# Patient Record
Sex: FEMALE | Race: WHITE | NOT HISPANIC OR LATINO | Employment: FULL TIME | URBAN - METROPOLITAN AREA
[De-identification: names, ages, dates, MRNs, and addresses within clinical notes are randomized per-mention and may not be internally consistent; named-entity substitution may affect disease eponyms.]

---

## 2017-08-22 ENCOUNTER — OFFICE VISIT (OUTPATIENT)
Dept: BARIATRICS/WEIGHT MGMT | Facility: CLINIC | Age: 42
End: 2017-08-22

## 2017-08-22 VITALS
TEMPERATURE: 97.9 F | HEART RATE: 74 BPM | OXYGEN SATURATION: 99 % | RESPIRATION RATE: 18 BRPM | DIASTOLIC BLOOD PRESSURE: 87 MMHG | BODY MASS INDEX: 38.18 KG/M2 | HEIGHT: 63 IN | SYSTOLIC BLOOD PRESSURE: 129 MMHG | WEIGHT: 215.5 LBS

## 2017-08-22 DIAGNOSIS — R10.13 DYSPEPSIA: ICD-10-CM

## 2017-08-22 DIAGNOSIS — M25.562 ARTHRALGIA OF LEFT KNEE: ICD-10-CM

## 2017-08-22 DIAGNOSIS — E55.9 VITAMIN D DEFICIENCY: ICD-10-CM

## 2017-08-22 DIAGNOSIS — Z98.84 S/P BARIATRIC SURGERY: ICD-10-CM

## 2017-08-22 DIAGNOSIS — R53.83 FATIGUE, UNSPECIFIED TYPE: Primary | ICD-10-CM

## 2017-08-22 DIAGNOSIS — E66.9 OBESITY, CLASS II, BMI 35-39.9: ICD-10-CM

## 2017-08-22 PROCEDURE — 99214 OFFICE O/P EST MOD 30 MIN: CPT | Performed by: PHYSICIAN ASSISTANT

## 2017-08-22 PROCEDURE — 94690 O2 UPTK REST INDIRECT: CPT | Performed by: PHYSICIAN ASSISTANT

## 2017-08-22 RX ORDER — ETODOLAC 400 MG/1
TABLET, FILM COATED ORAL
COMMUNITY
Start: 2017-06-04 | End: 2019-01-21

## 2017-08-22 RX ORDER — CITALOPRAM 40 MG/1
TABLET ORAL
COMMUNITY
Start: 2017-08-06

## 2017-08-22 NOTE — PROGRESS NOTES
"[facility]  [address]  Formerly [facility] [address]  [phone]        Patient Name:  Lucille Wolf.  :  1975      Date of Visit: 2017      Reason for Visit:   Annual Eval      HPI: Lucille Wolf is a 41 y.o. female 2 years s/p LSG/HHR by GDW on 7/24/15    LOV 2016 for annual eval.  Feeling good, but has had a weight loss stall for most of the last year.  Wanting to lose another 50 lbs if possible.  Feels she makes good food choices and has healthy habits, but does not know what she needs to do to \"jumpstart my weight loss again\".  Asking about the \"pouch reset\" which she has read about online.  No other issues/concerns.  Not currently tracking intake.  Admits that drinking 64 fluid oz/day is \"a struggle\".  Bariatric labs 2016 - low Vit D (18) w/ elevated Mg, Phos and Ferritin.  Taking a flintstone complete daily.  Was exercising consistently up until 2 months ago when she fell and hurt her (L) knee.  Still wearing a brace and gradually starting to exercise some more at this point.     Presurgery weight: 289 pounds.  Today's weight is 215 lb 8 oz (97.8 kg) pounds, today's  Body mass index is 38.17 kg/(m^2)., and her weight loss since surgery is 74 pounds.      Past Medical History:   Diagnosis Date   • Anxiety    • Basal cell carcinoma    • Depression    • Dyspepsia    • Dyspnea on exertion    • Fatigue    • Hypercholesteremia    • Hypertension    • Joint pain    • Morbid obesity    • Thrombocytosis      Past Surgical History:   Procedure Laterality Date   •  SECTION     • DIAGNOSTIC LAPAROSCOPY      for endometriosis   • DILATATION AND CURETTAGE     • GASTRIC SLEEVE LAPAROSCOPIC      s/p LSG/HHR by LYLEW on 7/24/15   • KNEE ARTHROSCOPY Left     scar tissue   • KNEE SURGERY Left     knee cap repair   • TOTAL ABDOMINAL HYSTERECTOMY      for endometriosis   • WRIST FRACTURE SURGERY Left      Outpatient " "Prescriptions Marked as Taking for the 8/22/17 encounter (Office Visit) with KAYLIN Valdes   Medication Sig Dispense Refill   • citalopram (CeleXA) 40 MG tablet          Allergies   Allergen Reactions   • Augmentin [Amoxicillin-Pot Clavulanate] GI Intolerance     Says Keflex is ok   • Lisinopril      States: makes her feel strange    • Codeine Rash   • Tape Rash       Social History     Social History   • Marital status:      Spouse name: N/A   • Number of children: N/A   • Years of education: N/A     Occupational History   • Not on file.     Social History Main Topics   • Smoking status: Never Smoker   • Smokeless tobacco: Never Used   • Alcohol use No   • Drug use: No   • Sexual activity: Defer      Comment:      Other Topics Concern   • Not on file     Social History Narrative   • No narrative on file       /87 (BP Location: Left arm, Patient Position: Sitting, Cuff Size: Large Adult)  Pulse 74  Temp 97.9 °F (36.6 °C) (Temporal Artery )   Resp 18  Ht 63\" (160 cm)  Wt 215 lb 8 oz (97.8 kg)  SpO2 99%  BMI 38.17 kg/m2    Physical Exam   Constitutional: She appears well-developed and well-nourished. She is cooperative.   HENT:   Mouth/Throat: Oropharynx is clear and moist and mucous membranes are normal.   Eyes: Conjunctivae are normal. No scleral icterus.   Cardiovascular: Normal rate.    Pulmonary/Chest: Effort normal.   Abdominal: Soft. There is no tenderness.   Musculoskeletal: Normal range of motion. She exhibits no edema.   Neurological: She is alert.   Skin: Skin is warm and dry. No rash noted.   Psychiatric: She has a normal mood and affect. Judgment normal.         Assessment:  2 years s/p LSG/HHR by BOYD on 7/24/15    ICD-10-CM ICD-9-CM   1. Fatigue, unspecified type R53.83 780.79   2. Dyspepsia R10.13 536.8   3. Arthralgia of left knee M25.562 719.46   4. Vitamin D deficiency E55.9 268.9   5. Obesity, Class II, BMI 35-39.9 E66.01 278.01   6. S/P bariatric surgery Z98.84 " V45.86       Plan:  BMR today to determine WLZ.  Advised to start tracking intake and adjust calories according to BMR.  Encouraged she research Paleo, Whole 30 or the 21 Day Fix.  Increase exercise /activity as tolerated - recommended yoga if not able to get back to the gym.  Routine bariatric labs ordered.  Continue vitamins w/ adjustments pending lab results.  Call w/ problems/concerns.     The patient was instructed to follow up in 3 months, sooner if needed.    note: approx 15 of the 25 minute visit was spent counseling on nutrition and necessary dietary/lifestyle modifications.

## 2017-08-26 LAB
25(OH)D3+25(OH)D2 SERPL-MCNC: 16 NG/ML (ref 30–100)
A-TOCOPHEROL VIT E SERPL-MCNC: 11.8 MG/L (ref 5.3–16.8)
ALBUMIN SERPL-MCNC: 4.1 G/DL (ref 3.5–5.5)
ALBUMIN/GLOB SERPL: 1.9 {RATIO} (ref 1.2–2.2)
ALP SERPL-CCNC: 62 IU/L (ref 39–117)
ALT SERPL-CCNC: 10 IU/L (ref 0–32)
AST SERPL-CCNC: 13 IU/L (ref 0–40)
BASOPHILS # BLD AUTO: 0 X10E3/UL (ref 0–0.2)
BASOPHILS NFR BLD AUTO: 0 %
BILIRUB SERPL-MCNC: 0.2 MG/DL (ref 0–1.2)
BUN SERPL-MCNC: 17 MG/DL (ref 6–20)
BUN/CREAT SERPL: 27 (ref 9–23)
CALCIUM SERPL-MCNC: 9.4 MG/DL (ref 8.7–10.2)
CHLORIDE SERPL-SCNC: 102 MMOL/L (ref 96–106)
CO2 SERPL-SCNC: 24 MMOL/L (ref 18–29)
CREAT SERPL-MCNC: 0.64 MG/DL (ref 0.57–1)
EOSINOPHIL # BLD AUTO: 0.1 X10E3/UL (ref 0–0.4)
EOSINOPHIL NFR BLD AUTO: 1 %
ERYTHROCYTE [DISTWIDTH] IN BLOOD BY AUTOMATED COUNT: 13.7 % (ref 12.3–15.4)
FERRITIN SERPL-MCNC: 153 NG/ML (ref 15–150)
FOLATE SERPL-MCNC: 10.8 NG/ML
GLOBULIN SER CALC-MCNC: 2.2 G/DL (ref 1.5–4.5)
GLUCOSE SERPL-MCNC: 91 MG/DL (ref 65–99)
HCT VFR BLD AUTO: 38.7 % (ref 34–46.6)
HGB BLD-MCNC: 13 G/DL (ref 11.1–15.9)
IMM GRANULOCYTES # BLD: 0 X10E3/UL (ref 0–0.1)
IMM GRANULOCYTES NFR BLD: 0 %
IRON SERPL-MCNC: 46 UG/DL (ref 27–159)
LYMPHOCYTES # BLD AUTO: 2.5 X10E3/UL (ref 0.7–3.1)
LYMPHOCYTES NFR BLD AUTO: 32 %
MAGNESIUM SERPL-MCNC: 2.3 MG/DL (ref 1.6–2.3)
MCH RBC QN AUTO: 29.3 PG (ref 26.6–33)
MCHC RBC AUTO-ENTMCNC: 33.6 G/DL (ref 31.5–35.7)
MCV RBC AUTO: 87 FL (ref 79–97)
METHYLMALONATE SERPL-SCNC: 170 NMOL/L (ref 0–378)
MONOCYTES # BLD AUTO: 0.8 X10E3/UL (ref 0.1–0.9)
MONOCYTES NFR BLD AUTO: 10 %
NEUTROPHILS # BLD AUTO: 4.5 X10E3/UL (ref 1.4–7)
NEUTROPHILS NFR BLD AUTO: 57 %
PHOSPHATE SERPL-MCNC: 4.3 MG/DL (ref 2.5–4.5)
PLATELET # BLD AUTO: 368 X10E3/UL (ref 150–379)
POTASSIUM SERPL-SCNC: 4.5 MMOL/L (ref 3.5–5.2)
PREALB SERPL-MCNC: 24 MG/DL (ref 14–35)
PROT SERPL-MCNC: 6.3 G/DL (ref 6–8.5)
PTH-INTACT SERPL-MCNC: 33 PG/ML (ref 15–65)
RBC # BLD AUTO: 4.44 X10E6/UL (ref 3.77–5.28)
SODIUM SERPL-SCNC: 142 MMOL/L (ref 134–144)
VIT A SERPL-MCNC: 47 UG/DL (ref 20–65)
VIT B1 BLD-SCNC: 160.8 NMOL/L (ref 66.5–200)
WBC # BLD AUTO: 7.9 X10E3/UL (ref 3.4–10.8)
ZINC SERPL-MCNC: 67 UG/DL (ref 56–134)

## 2017-09-12 RX ORDER — ERGOCALCIFEROL 1.25 MG/1
50000 CAPSULE ORAL WEEKLY
Qty: 12 CAPSULE | Refills: 0 | OUTPATIENT
Start: 2017-09-12 | End: 2019-01-21

## 2019-01-21 PROBLEM — J06.9 VIRAL URI WITH COUGH: Status: ACTIVE | Noted: 2019-01-21

## 2023-02-01 ENCOUNTER — TRANSCRIBE ORDERS (OUTPATIENT)
Dept: ADMINISTRATIVE | Facility: HOSPITAL | Age: 48
End: 2023-02-01
Payer: COMMERCIAL

## 2023-02-01 DIAGNOSIS — N63.10 MASS OF RIGHT BREAST, UNSPECIFIED QUADRANT: Primary | ICD-10-CM

## 2023-02-20 ENCOUNTER — HOSPITAL ENCOUNTER (OUTPATIENT)
Dept: ULTRASOUND IMAGING | Facility: HOSPITAL | Age: 48
Discharge: HOME | End: 2023-02-20
Payer: COMMERCIAL

## 2023-02-20 ENCOUNTER — HOSPITAL ENCOUNTER (OUTPATIENT)
Dept: MAMMOGRAPHY | Facility: HOSPITAL | Age: 48
Discharge: HOME | End: 2023-02-20
Payer: COMMERCIAL

## 2023-02-20 DIAGNOSIS — N63.10 MASS OF RIGHT BREAST, UNSPECIFIED QUADRANT: ICD-10-CM

## 2023-02-20 PROCEDURE — 76642 ULTRASOUND BREAST LIMITED: CPT

## 2023-02-20 PROCEDURE — 77062 BREAST TOMOSYNTHESIS BI: CPT | Performed by: RADIOLOGY

## 2023-02-20 PROCEDURE — G0279 TOMOSYNTHESIS, MAMMO: HCPCS

## 2023-02-20 PROCEDURE — 77066 DX MAMMO INCL CAD BI: CPT | Performed by: RADIOLOGY

## 2023-02-20 PROCEDURE — 76642 ULTRASOUND BREAST LIMITED: CPT | Performed by: RADIOLOGY

## 2023-02-20 PROCEDURE — 77066 DX MAMMO INCL CAD BI: CPT

## 2023-08-17 ENCOUNTER — OFFICE VISIT (OUTPATIENT)
Dept: FAMILY MEDICINE CLINIC | Facility: CLINIC | Age: 48
End: 2023-08-17
Payer: COMMERCIAL

## 2023-08-17 VITALS
OXYGEN SATURATION: 98 % | SYSTOLIC BLOOD PRESSURE: 126 MMHG | WEIGHT: 220 LBS | BODY MASS INDEX: 38.98 KG/M2 | DIASTOLIC BLOOD PRESSURE: 74 MMHG | HEART RATE: 108 BPM | HEIGHT: 63 IN

## 2023-08-17 DIAGNOSIS — C44.91 BASAL CELL CARCINOMA (BCC), UNSPECIFIED SITE: ICD-10-CM

## 2023-08-17 DIAGNOSIS — Z11.59 ENCOUNTER FOR HEPATITIS C SCREENING TEST FOR LOW RISK PATIENT: ICD-10-CM

## 2023-08-17 DIAGNOSIS — Z00.00 WELL ADULT EXAM: Primary | ICD-10-CM

## 2023-08-17 DIAGNOSIS — E66.01 MORBID (SEVERE) OBESITY DUE TO EXCESS CALORIES: ICD-10-CM

## 2023-08-17 NOTE — PROGRESS NOTES
"Chief Complaint  Establish Care    Subjective          Lucille Jeong presents to [facility] PR[facility]  History of Present Illness    Patient is here to establish care.     She states that it has been about a year since her last visit with a provider.     She has Basal cell carcinoma and Squamous cell carcinoma and is s/p Mohs surgery on face to remove these and she sees dermatology for this.     Mother with Htn, HLD Father with NKDA, Daughter with Seizure disorder.     No Primary family hx of breast or colon cancer    Colon cancer screening about 2 years ago.     Hx of gastric sleeve. She states that she has struggled with weight for many years. She states that she has tried several diets, and is trying to watch her diet without significant improvement in weight. She would like to talk about options to help with this.     Objective   Vital Signs:   /74   Pulse 108   Ht 160 cm (63\")   Wt 99.8 kg (220 lb)   SpO2 98%   BMI 38.97 kg/mý     Body mass index is 38.97 kg/mý.    Review of Systems    Past History:  Medical History: has a past medical history of Anxiety, Basal cell carcinoma, Depression, Diverticulosis, Dyspepsia, Dyspnea on exertion, Fatigue, Hypercholesteremia, Hypertension, Joint pain, Morbid obesity, and Thrombocytosis.   Surgical History: has a past surgical history that includes Total abdominal hysterectomy ();  section (); Knee surgery (Left, ); Knee Arthroscopy (Left, ); Wrist fracture surgery (Left, ); Dilation and curettage of uterus (); Diagnostic laparoscopy (); Gastric Sleeve (); Bariatric Surgery (2015); and Colonoscopy ().   Family History: family history includes Breast cancer in her paternal grandmother; Cancer in her mother; Hypertension in her maternal grandfather and mother; Obesity in her father and paternal grandmother.   Social History: reports that she has never smoked. She has never used " smokeless tobacco. She reports that she does not drink alcohol and does not use drugs.      Current Outpatient Medications:     citalopram (CeleXA) 40 MG tablet, , Disp: , Rfl:     Allergies: Augmentin [amoxicillin-pot clavulanate], Lisinopril, Codeine, and Tape    Physical Exam  Constitutional:       General: She is not in acute distress.     Appearance: She is not ill-appearing or toxic-appearing.   HENT:      Head: Normocephalic and atraumatic.   Cardiovascular:      Rate and Rhythm: Normal rate and regular rhythm.      Heart sounds: No murmur heard.  Pulmonary:      Effort: Pulmonary effort is normal. No respiratory distress.   Musculoskeletal:      Right lower leg: No edema.      Left lower leg: No edema.   Neurological:      General: No focal deficit present.      Mental Status: She is alert and oriented to person, place, and time.   Psychiatric:         Mood and Affect: Mood normal.         Thought Content: Thought content normal.        Result Review :                   Assessment and Plan    Diagnoses and all orders for this visit:    1. Well adult exam (Primary)  -     Comprehensive Metabolic Panel  -     CBC & Differential  -     Lipid Panel  -     TSH  -     T4, Free    2. Encounter for hepatitis C screening test for low risk patient  -     Hepatitis C antibody    3. Morbid (severe) obesity due to excess calories    4. Basal cell carcinoma (BCC), unspecified site    Blood work ordered and will contact with results when available. Will adjust medications based on abnormalities seen.     Discussed with patient her weight and recommended eating a balanced diet as well as adding in weight training to improve basal metabolic weight. Advised that currently Wegovy and other medications for weight loss are on back order at this time and difficult to get. Will defer starting something like this at this time.     Past medical and surgical history as well as allergies, family history and social history were  reviewed, and discussed with patient.  Chronic conditions were reviewed as well as medications.   Anticipatory guidance handouts including healthy diet, health maintenance, as well as regular exercise and general instructions were given via [employer], and patient was able to ask questions and discuss any concerns.        Follow Up   No follow-ups on file.  Patient was given instructions and counseling regarding her condition or for health maintenance advice. Please see specific information pulled into the AVS if appropriate.     Nataliia Jurado, DO

## 2023-08-18 LAB
ALBUMIN SERPL-MCNC: 4.2 G/DL (ref 3.9–4.9)
ALBUMIN/GLOB SERPL: 1.8 {RATIO} (ref 1.2–2.2)
ALP SERPL-CCNC: 83 IU/L (ref 44–121)
ALT SERPL-CCNC: 12 IU/L (ref 0–32)
AST SERPL-CCNC: 12 IU/L (ref 0–40)
BASOPHILS # BLD AUTO: 0.1 X10E3/UL (ref 0–0.2)
BASOPHILS NFR BLD AUTO: 1 %
BILIRUB SERPL-MCNC: 0.4 MG/DL (ref 0–1.2)
BUN SERPL-MCNC: 12 MG/DL (ref 6–24)
BUN/CREAT SERPL: 17 (ref 9–23)
CALCIUM SERPL-MCNC: 8.8 MG/DL (ref 8.7–10.2)
CHLORIDE SERPL-SCNC: 103 MMOL/L (ref 96–106)
CHOLEST SERPL-MCNC: 248 MG/DL (ref 100–199)
CO2 SERPL-SCNC: 25 MMOL/L (ref 20–29)
CREAT SERPL-MCNC: 0.7 MG/DL (ref 0.57–1)
EGFRCR SERPLBLD CKD-EPI 2021: 109 ML/MIN/1.73
EOSINOPHIL # BLD AUTO: 0.1 X10E3/UL (ref 0–0.4)
EOSINOPHIL NFR BLD AUTO: 1 %
ERYTHROCYTE [DISTWIDTH] IN BLOOD BY AUTOMATED COUNT: 13 % (ref 11.7–15.4)
GLOBULIN SER CALC-MCNC: 2.3 G/DL (ref 1.5–4.5)
GLUCOSE SERPL-MCNC: 87 MG/DL (ref 70–99)
HCT VFR BLD AUTO: 43.8 % (ref 34–46.6)
HCV IGG SERPL QL IA: NON REACTIVE
HDLC SERPL-MCNC: 59 MG/DL
HGB BLD-MCNC: 14.2 G/DL (ref 11.1–15.9)
IMM GRANULOCYTES # BLD AUTO: 0 X10E3/UL (ref 0–0.1)
IMM GRANULOCYTES NFR BLD AUTO: 0 %
LDLC SERPL CALC-MCNC: 164 MG/DL (ref 0–99)
LYMPHOCYTES # BLD AUTO: 1.7 X10E3/UL (ref 0.7–3.1)
LYMPHOCYTES NFR BLD AUTO: 27 %
MCH RBC QN AUTO: 28.5 PG (ref 26.6–33)
MCHC RBC AUTO-ENTMCNC: 32.4 G/DL (ref 31.5–35.7)
MCV RBC AUTO: 88 FL (ref 79–97)
MONOCYTES # BLD AUTO: 0.6 X10E3/UL (ref 0.1–0.9)
MONOCYTES NFR BLD AUTO: 9 %
NEUTROPHILS # BLD AUTO: 3.9 X10E3/UL (ref 1.4–7)
NEUTROPHILS NFR BLD AUTO: 62 %
PLATELET # BLD AUTO: 398 X10E3/UL (ref 150–450)
POTASSIUM SERPL-SCNC: 4 MMOL/L (ref 3.5–5.2)
PROT SERPL-MCNC: 6.5 G/DL (ref 6–8.5)
RBC # BLD AUTO: 4.99 X10E6/UL (ref 3.77–5.28)
SODIUM SERPL-SCNC: 143 MMOL/L (ref 134–144)
T4 FREE SERPL-MCNC: 1.17 NG/DL (ref 0.82–1.77)
TRIGL SERPL-MCNC: 140 MG/DL (ref 0–149)
TSH SERPL DL<=0.005 MIU/L-ACNC: 1.75 UIU/ML (ref 0.45–4.5)
VLDLC SERPL CALC-MCNC: 25 MG/DL (ref 5–40)
WBC # BLD AUTO: 6.3 X10E3/UL (ref 3.4–10.8)

## 2023-09-27 ENCOUNTER — OFFICE VISIT (OUTPATIENT)
Dept: FAMILY MEDICINE CLINIC | Facility: CLINIC | Age: 48
End: 2023-09-27
Payer: COMMERCIAL

## 2023-09-27 VITALS
HEART RATE: 85 BPM | WEIGHT: 248 LBS | OXYGEN SATURATION: 97 % | SYSTOLIC BLOOD PRESSURE: 130 MMHG | DIASTOLIC BLOOD PRESSURE: 70 MMHG | BODY MASS INDEX: 43.94 KG/M2 | HEIGHT: 63 IN

## 2023-09-27 DIAGNOSIS — E66.01 MORBID (SEVERE) OBESITY DUE TO EXCESS CALORIES: Primary | ICD-10-CM

## 2023-09-27 NOTE — PROGRESS NOTES
"Chief Complaint  No chief complaint on file.    Subjective          Lucille Jeong presents to [facility]  History of Present Illness    Patient presents the office today to discuss weight management.  She states she has tried several things in the past to help with weight, but has had difficulty losing weight and when she does lose it maintaining weight loss.  She has tried several different diets without any significant improvement including low calorie, high-protein, low-carb, and intermittent fasting.    Objective   Vital Signs:   /70   Pulse 85   Ht 160 cm (63\")   Wt 112 kg (248 lb)   SpO2 97%   BMI 43.93 kg/m²     Body mass index is 43.93 kg/m².    Review of Systems    Past History:  Medical History: has a past medical history of Anxiety, Basal cell carcinoma, Depression, Diverticulosis, Dyspepsia, Dyspnea on exertion, Fatigue, Hypercholesteremia, Hypertension, Joint pain, Morbid obesity, and Thrombocytosis.   Surgical History: has a past surgical history that includes Total abdominal hysterectomy ();  section (); Knee surgery (Left, ); Knee Arthroscopy (Left, ); Wrist fracture surgery (Left, ); Dilation and curettage of uterus (); Diagnostic laparoscopy (); Gastric Sleeve (); Bariatric Surgery (2015); and Colonoscopy ().   Family History: family history includes Breast cancer in her paternal grandmother; Cancer in her mother; Hypertension in her maternal grandfather and mother; Obesity in her father and paternal grandmother.   Social History: reports that she has never smoked. She has never used smokeless tobacco. She reports that she does not drink alcohol and does not use drugs.      Current Outpatient Medications:     citalopram (CeleXA) 40 MG tablet, , Disp: , Rfl:     Semaglutide-Weight Management 0.25 MG/0.5ML solution auto-injector, Inject 0.25 mg under the skin into the appropriate area as directed 1 " (One) Time Per Week., Disp: 1.5 mL, Rfl: 0    Allergies: Augmentin [amoxicillin-pot clavulanate], Lisinopril, Codeine, and Tape    Physical Exam  Constitutional:       General: She is not in acute distress.     Appearance: She is not ill-appearing or toxic-appearing.   HENT:      Head: Normocephalic and atraumatic.   Cardiovascular:      Rate and Rhythm: Normal rate and regular rhythm.      Heart sounds: No murmur heard.  Pulmonary:      Effort: Pulmonary effort is normal. No respiratory distress.   Neurological:      General: No focal deficit present.      Mental Status: She is alert and oriented to person, place, and time.   Psychiatric:         Mood and Affect: Mood normal.         Thought Content: Thought content normal.        Result Review :                   Assessment and Plan    Diagnoses and all orders for this visit:    1. Morbid (severe) obesity due to excess calories (Primary)    Other orders  -     Semaglutide-Weight Management 0.25 MG/0.5ML solution auto-injector; Inject 0.25 mg under the skin into the appropriate area as directed 1 (One) Time Per Week.  Dispense: 1.5 mL; Refill: 0    Discussed with patient options for weight management and will start Wegovy at this time 0.25 mg.  Discussed with patient that we will follow-up with her in about 1 month to discuss increasing medication, and then 3 months after starting the medicine for weight check.  She is agreeable to this.  Call with any new or worsening symptoms.    Follow Up   No follow-ups on file.  Patient was given instructions and counseling regarding her condition or for health maintenance advice. Please see specific information pulled into the AVS if appropriate.     Nataliia Jurado, DO

## 2023-10-13 ENCOUNTER — OFFICE VISIT (OUTPATIENT)
Dept: FAMILY MEDICINE CLINIC | Facility: CLINIC | Age: 48
End: 2023-10-13
Payer: COMMERCIAL

## 2023-10-13 VITALS
DIASTOLIC BLOOD PRESSURE: 94 MMHG | HEIGHT: 63 IN | WEIGHT: 245 LBS | BODY MASS INDEX: 43.41 KG/M2 | TEMPERATURE: 98.6 F | OXYGEN SATURATION: 99 % | SYSTOLIC BLOOD PRESSURE: 138 MMHG | HEART RATE: 103 BPM

## 2023-10-13 DIAGNOSIS — H10.33 ACUTE BACTERIAL CONJUNCTIVITIS OF BOTH EYES: Primary | ICD-10-CM

## 2023-10-13 PROCEDURE — 99213 OFFICE O/P EST LOW 20 MIN: CPT | Performed by: INTERNAL MEDICINE

## 2023-10-13 RX ORDER — POLYMYXIN B SULFATE AND TRIMETHOPRIM 1; 10000 MG/ML; [USP'U]/ML
1 SOLUTION OPHTHALMIC EVERY 4 HOURS
Qty: 10 ML | Refills: 0 | Status: SHIPPED | OUTPATIENT
Start: 2023-10-13

## 2023-10-13 NOTE — PROGRESS NOTES
Office Note     Name: Lucille Jeong    : 1975     MRN: [id number]     Chief Complaint  Eye Pain (Pt has concerns with pink eye today. )    Subjective     History of Present Illness:  Lucille Jeong is a 48 y.o. female who presents today for possible pinkeye    Left eye started getting red burning, oozing last night then today left started as well.  No fevers. No vision change.    Has had a little sensitivity to light but no vision change    Review of Systems:   Review of Systems    Past Medical History:   Past Medical History:   Diagnosis Date    Anxiety     Basal cell carcinoma     Depression     Diverticulosis     Diverticulitis    Dyspepsia     Dyspnea on exertion     Fatigue     Hypercholesteremia     Hypertension     Joint pain     Morbid obesity     Thrombocytosis        Past Surgical History:   Past Surgical History:   Procedure Laterality Date    BARIATRIC SURGERY  2015    Gastric sleeve     SECTION      COLONOSCOPY      Normal    DIAGNOSTIC LAPAROSCOPY      for endometriosis    DILATATION AND CURETTAGE      GASTRIC SLEEVE LAPAROSCOPIC      s/p LSG/HHR by BOYD on 7/24/15    KNEE ARTHROSCOPY Left     scar tissue    KNEE SURGERY Left     knee cap repair    TOTAL ABDOMINAL HYSTERECTOMY      for endometriosis    WRIST FRACTURE SURGERY Left        Family History:   Family History   Problem Relation Age of Onset    Cancer Mother     Hypertension Mother     Obesity Father     Hypertension Maternal Grandfather     Breast cancer Paternal Grandmother     Obesity Paternal Grandmother        Social History:   Social History     Socioeconomic History    Marital status:    Tobacco Use    Smoking status: Never    Smokeless tobacco: Never   Vaping Use    Vaping Use: Never used   Substance and Sexual Activity    Alcohol use: No    Drug use: No    Sexual activity: Not Currently     Partners: Male     Birth control/protection:  "Hysterectomy     Comment:        Immunizations:   Immunization History   Administered Date(s) Administered    COVID-19 (PFIZER) Purple Cap Monovalent 07/27/2021, 08/20/2021    Td (TDVAX) 05/01/2022        Medications:     Current Outpatient Medications:     citalopram (CeleXA) 40 MG tablet, , Disp: , Rfl:     Semaglutide-Weight Management 0.25 MG/0.5ML solution auto-injector, Inject 0.25 mg under the skin into the appropriate area as directed 1 (One) Time Per Week., Disp: 1.5 mL, Rfl: 0      Allergies:   Allergies   Allergen Reactions    Augmentin [Amoxicillin-Pot Clavulanate] GI Intolerance     Says Keflex is ok    Lisinopril      States: makes her feel strange     Codeine Rash    Tape Rash       Objective     Vital Signs  /94   Pulse 103   Temp 98.6 °F (37 °C) (Oral)   Ht 160 cm (63\")   Wt 111 kg (245 lb)   SpO2 99%   BMI 43.40 kg/m²   Estimated body mass index is 43.4 kg/m² as calculated from the following:    Height as of this encounter: 160 cm (63\").    Weight as of this encounter: 111 kg (245 lb).            Physical Exam  Vitals and nursing note reviewed.   Constitutional:       Appearance: Normal appearance.   Eyes:      Extraocular Movements: Extraocular movements intact.      Conjunctiva/sclera:      Right eye: Right conjunctiva is injected.      Left eye: Left conjunctiva is injected. Exudate present.      Pupils: Pupils are equal, round, and reactive to light.   Cardiovascular:      Rate and Rhythm: Normal rate and regular rhythm.      Heart sounds: No murmur heard.     No friction rub. No gallop.   Pulmonary:      Effort: Pulmonary effort is normal.      Breath sounds: Normal breath sounds. No wheezing, rhonchi or rales.   Neurological:      Mental Status: She is alert.            Procedures     Assessment and Plan     1. Acute bacterial conjunctivitis of both eyes    - trimethoprim-polymyxin b (Polytrim) 36463-0.1 UNIT/ML-% ophthalmic solution; Administer 1 drop to both eyes Every 4 " (Four) Hours.  Dispense: 10 mL; Refill: 0     Advised if symptoms get worse of sensitivity to ligth gets worse to be re-evaluated    Follow Up  Return if symptoms worsen or fail to improve.    Patient was advised to call the office or seek medical care if  any issues discussed during this visit worsen or persist or if new concerns arise        MD [facility]  [address] [address] [phone]

## 2023-10-24 ENCOUNTER — TELEPHONE (OUTPATIENT)
Dept: FAMILY MEDICINE CLINIC | Facility: CLINIC | Age: 48
End: 2023-10-24
Payer: COMMERCIAL

## 2023-12-04 ENCOUNTER — TELEPHONE (OUTPATIENT)
Dept: FAMILY MEDICINE CLINIC | Facility: CLINIC | Age: 48
End: 2023-12-04
Payer: COMMERCIAL

## 2023-12-04 NOTE — TELEPHONE ENCOUNTER
Caller: Lucille Jeong    Relationship to patient: Self    Best call back number: [phone]     Chief complaint: CHEST PAIN, CONGESTION, COUGH    Patient directed to call 911 or go to their nearest emergency room.     Patient verbalized understanding: [x] Yes  [] No  If no, why?    Additional notes:

## 2023-12-17 RX ORDER — CITALOPRAM 40 MG/1
TABLET ORAL
Status: CANCELLED | OUTPATIENT
Start: 2023-12-17

## 2024-01-02 ENCOUNTER — TELEPHONE (OUTPATIENT)
Dept: FAMILY MEDICINE CLINIC | Facility: CLINIC | Age: 49
End: 2024-01-02
Payer: COMMERCIAL

## 2024-01-02 NOTE — TELEPHONE ENCOUNTER
Caller: Lucille Jeong    Relationship: Self    Best call back number: [phone]     What medication are you requesting: WEGOVY    What are your current symptoms:     How long have you been experiencing symptoms:     Have you had these symptoms before:    [] Yes  [] No    Have you been treated for these symptoms before:   [] Yes  [] No    If a prescription is needed, what is your preferred pharmacy and phone number:  [facility] [place]    Additional notes: PATIENT WOULD LIKE TO BE PUT ON WEGOVY NOT THE OZEMPIC. PHARMACY WILL NEED A PRIOR AUTHORIZATION WITH THAT.

## 2024-01-03 NOTE — TELEPHONE ENCOUNTER
Called pt back about this. The medication is approved but it is out of stock at [facility]. Pt is going to call around and see if she can find it somewhere else.

## 2024-01-03 NOTE — TELEPHONE ENCOUNTER
HUB RELAY    I sent pt a [employer] message and I have left a couple voicemails about this already. She was never prescribed Ozempic. I already did a PA for Wegovy and it was approved.

## 2024-01-24 ENCOUNTER — OFFICE VISIT (OUTPATIENT)
Dept: FAMILY MEDICINE CLINIC | Facility: CLINIC | Age: 49
End: 2024-01-24
Payer: COMMERCIAL

## 2024-01-24 VITALS
DIASTOLIC BLOOD PRESSURE: 100 MMHG | WEIGHT: 243 LBS | HEIGHT: 63 IN | OXYGEN SATURATION: 97 % | SYSTOLIC BLOOD PRESSURE: 138 MMHG | HEART RATE: 95 BPM | BODY MASS INDEX: 43.05 KG/M2

## 2024-01-24 DIAGNOSIS — G43.911 INTRACTABLE MIGRAINE WITH STATUS MIGRAINOSUS, UNSPECIFIED MIGRAINE TYPE: Primary | ICD-10-CM

## 2024-01-24 PROCEDURE — 99213 OFFICE O/P EST LOW 20 MIN: CPT | Performed by: STUDENT IN AN ORGANIZED HEALTH CARE EDUCATION/TRAINING PROGRAM

## 2024-01-24 RX ORDER — RIMEGEPANT SULFATE 75 MG/75MG
75 TABLET, ORALLY DISINTEGRATING ORAL
Qty: 4 TABLET | Refills: 0 | COMMUNITY
Start: 2024-01-24
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
## 2024-02-22 ENCOUNTER — OFFICE VISIT (OUTPATIENT)
Dept: FAMILY MEDICINE CLINIC | Facility: CLINIC | Age: 49
End: 2024-02-22
Payer: COMMERCIAL

## 2024-02-22 VITALS
HEIGHT: 63 IN | SYSTOLIC BLOOD PRESSURE: 124 MMHG | DIASTOLIC BLOOD PRESSURE: 84 MMHG | WEIGHT: 243 LBS | BODY MASS INDEX: 43.05 KG/M2 | OXYGEN SATURATION: 97 % | HEART RATE: 94 BPM

## 2024-02-22 DIAGNOSIS — J02.9 SORE THROAT: ICD-10-CM

## 2024-02-22 DIAGNOSIS — B37.0 THRUSH: Primary | ICD-10-CM

## 2024-02-22 LAB
EXPIRATION DATE: NORMAL
INTERNAL CONTROL: NORMAL
Lab: NORMAL
S PYO AG THROAT QL: NEGATIVE

## 2024-02-22 NOTE — PROGRESS NOTES
"Chief Complaint  Sore Throat (Pain on tougne. X4days burns when drinks carbonated drinks)    Subjective          History of Present Illness  Lucille Jeong is here today with her sore tongue    Patient states that she started to have some tongue issues this week.  She states that for 5 days ago she made an oven pizza and thought she might of burned her tongue while eating it.  She states that for the next several days her tongue is gotten worse and worse.  She states that she can eat and it does not bother her but notices that when she drinks anything especially anything carbonated that it burns her tongue.  She states that she has not had any postnasal drainage allergy symptoms.  She denies any headaches.  She states she is no longer on Wegovy and does not have diabetes.  She states that overall the pain has been getting worse.  She states that she has not had any change in toothpaste toothbrush or mouthwash.      Objective   Vital Signs:   /84   Pulse 94   Ht 160 cm (63\")   Wt 110 kg (243 lb)   SpO2 97%   BMI 43.05 kg/m²     Body mass index is 43.05 kg/m².         Review of Systems      Current Outpatient Medications:   •  citalopram (CeleXA) 40 MG tablet, Take 1 tablet by mouth Daily., Disp: 90 tablet, Rfl: 1  •  topiramate (Topamax) 25 MG tablet, Take 2 tablets by mouth Daily., Disp: 30 tablet, Rfl: 1  •  nystatin (MYCOSTATIN) 100,000 unit/mL suspension, Swish and swallow 5 mL 4 (Four) Times a Day., Disp: 473 mL, Rfl: 0    Allergies: Augmentin [amoxicillin-pot clavulanate], Lisinopril, Codeine, and Tape    Physical Exam  Vitals and nursing note reviewed.   Constitutional:       General: She is not in acute distress.     Appearance: Normal appearance. She is not ill-appearing, toxic-appearing or diaphoretic.   HENT:      Head: Normocephalic and atraumatic.      Nose: Nose normal.      Mouth/Throat:      Mouth: Mucous membranes are moist.      Pharynx: Oropharynx is clear. No posterior " oropharyngeal erythema.   Eyes:      Pupils: Pupils are equal, round, and reactive to light.   Cardiovascular:      Rate and Rhythm: Normal rate and regular rhythm.      Heart sounds: Normal heart sounds.   Pulmonary:      Effort: Pulmonary effort is normal.   Neurological:      General: No focal deficit present.      Mental Status: She is alert.   Psychiatric:         Mood and Affect: Mood normal.         Behavior: Behavior normal.        Result Review :                   Assessment and Plan    Diagnoses and all orders for this visit:    1. Thrush (Primary)  -     nystatin (MYCOSTATIN) 100,000 unit/mL suspension; Swish and swallow 5 mL 4 (Four) Times a Day.  Dispense: 473 mL; Refill: 0  Discussed with patient that her mouth does not appear to have the prototypical thrush appearance but she does have some signs and will have her try some statin.  If this does not helping and things are getting worse she is obviously to come and talk to our office sooner.  2. Sore throat  -     POC Rapid Strep A  We discussed that her strep test was negative      Follow Up   No follow-ups on file.  Patient was given instructions and counseling regarding her condition or for health maintenance advice. Please see specific information pulled into the AVS if appropriate.     KAYLIN Valles  02/22/2024

## 2024-02-23 PROBLEM — M25.531 WRIST PAIN, RIGHT: Status: ACTIVE | Noted: 2022-06-29

## 2024-02-23 PROBLEM — D31.31 CHOROIDAL NEVUS OF RIGHT EYE: Status: ACTIVE | Noted: 2022-06-16

## 2024-02-23 PROBLEM — S69.81XD TFCC (TRIANGULAR FIBROCARTILAGE COMPLEX) INJURY, RIGHT, SUBSEQUENT ENCOUNTER: Status: ACTIVE | Noted: 2022-07-21

## 2024-02-23 PROBLEM — E55.9 VITAMIN D DEFICIENCY: Status: ACTIVE | Noted: 2018-05-21

## 2024-02-23 PROBLEM — H52.4 PRESBYOPIA OF BOTH EYES: Status: ACTIVE | Noted: 2022-06-16

## 2024-02-23 PROBLEM — S62.114D CLOSED NONDISPLACED FRACTURE OF TRIQUETRUM OF RIGHT WRIST WITH ROUTINE HEALING: Status: ACTIVE | Noted: 2022-06-29

## 2024-02-23 PROBLEM — M65.4 RADIAL STYLOID TENOSYNOVITIS (DE QUERVAIN): Status: ACTIVE | Noted: 2022-07-21
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
## 2024-02-23 PROBLEM — K57.90 DIVERTICULOSIS: Status: ACTIVE | Noted: 2019-04-26

## 2024-02-23 PROBLEM — S92.009A CALCANEUS FRACTURE: Status: ACTIVE | Noted: 2018-02-19

## 2024-02-23 PROBLEM — R07.9 CHEST PAIN: Status: ACTIVE | Noted: 2023-12-04

## 2024-03-20 ENCOUNTER — OFFICE VISIT (OUTPATIENT)
Dept: FAMILY MEDICINE CLINIC | Facility: CLINIC | Age: 49
End: 2024-03-20
Payer: COMMERCIAL

## 2024-03-20 VITALS
DIASTOLIC BLOOD PRESSURE: 86 MMHG | OXYGEN SATURATION: 97 % | BODY MASS INDEX: 42.88 KG/M2 | SYSTOLIC BLOOD PRESSURE: 136 MMHG | WEIGHT: 242 LBS | HEART RATE: 107 BPM | HEIGHT: 63 IN

## 2024-03-20 DIAGNOSIS — E78.2 MIXED HYPERLIPIDEMIA: ICD-10-CM

## 2024-03-20 DIAGNOSIS — G43.911 INTRACTABLE MIGRAINE WITH STATUS MIGRAINOSUS, UNSPECIFIED MIGRAINE TYPE: Primary | ICD-10-CM

## 2024-03-20 PROCEDURE — 99213 OFFICE O/P EST LOW 20 MIN: CPT | Performed by: STUDENT IN AN ORGANIZED HEALTH CARE EDUCATION/TRAINING PROGRAM

## 2024-03-20 RX ORDER — TOPIRAMATE 100 MG/1
100 TABLET, FILM COATED ORAL
Qty: 90 TABLET | Refills: 1 | Status: SHIPPED | OUTPATIENT
Start: 2024-03-20

## 2024-03-20 NOTE — PROGRESS NOTES
"Chief Complaint  Headache    Subjective          Crystal Alisa Jeong presents to [facility]  History of Present Illness    Patient presents the office today for follow-up on headaches.  She states that she has had significant improvement in her headaches.  She states that She is doing well with the medication, and not having any side effects.  She would like to incrase the dose of medications at this time.     She is also due for blood work at this time.     Objective   Vital Signs:   /86   Pulse 107   Ht 160 cm (63\")   Wt 110 kg (242 lb)   SpO2 97%   BMI 42.87 kg/m²     Body mass index is 42.87 kg/m².    Review of Systems    Past History:  Medical History: has a past medical history of Anxiety, Basal cell carcinoma, Depression, Diverticulosis, Dyspepsia, Dyspnea on exertion, Fatigue, Hypercholesteremia, Hypertension, Joint pain, Morbid obesity, and Thrombocytosis.   Surgical History: has a past surgical history that includes Total abdominal hysterectomy ();  section (); Knee surgery (Left, ); Knee Arthroscopy (Left, ); Wrist fracture surgery (Left, ); Dilation and curettage of uterus (); Diagnostic laparoscopy (); Gastric Sleeve (); Bariatric Surgery (2015); and Colonoscopy ().   Family History: family history includes Breast cancer in her paternal grandmother; Cancer in her mother; Hypertension in her father, maternal grandfather, and mother; Obesity in her father and paternal grandmother.   Social History: reports that she has never smoked. She has never been exposed to tobacco smoke. She has never used smokeless tobacco. She reports that she does not drink alcohol and does not use drugs.      Current Outpatient Medications:     citalopram (CeleXA) 40 MG tablet, Take 1 tablet by mouth Daily., Disp: 90 tablet, Rfl: 1    topiramate (Topamax) 100 MG tablet, Take 1 tablet by mouth every night at bedtime., Disp: 90 tablet, " Rfl: 1    Allergies: Augmentin [amoxicillin-pot clavulanate], Lisinopril, Codeine, and Tape    Physical Exam  Constitutional:       General: She is not in acute distress.     Appearance: She is not ill-appearing or toxic-appearing.   HENT:      Head: Normocephalic and atraumatic.   Pulmonary:      Effort: Pulmonary effort is normal. No respiratory distress.   Neurological:      General: No focal deficit present.      Mental Status: She is alert and oriented to person, place, and time.   Psychiatric:         Mood and Affect: Mood normal.         Thought Content: Thought content normal.          Result Review :                   Assessment and Plan    Diagnoses and all orders for this visit:    1. Intractable migraine with status migrainosus, unspecified migraine type (Primary)  -     Comprehensive Metabolic Panel; Future  -     CBC & Differential; Future  -     TSH; Future  -     T4, Free; Future    2. Mixed hyperlipidemia  -     Lipid Panel; Future    Other orders  -     topiramate (Topamax) 100 MG tablet; Take 1 tablet by mouth every night at bedtime.  Dispense: 90 tablet; Refill: 1    Will incrase dose of topamax at this time form 50mg to 100mg. Call with any new or worsening symptoms.     Blood work ordered and will contact with results when available. Will adjust medications based on abnormalities seen.         Follow Up   No follow-ups on file.  Patient was given instructions and counseling regarding her condition or for health maintenance advice. Please see specific information pulled into the AVS if appropriate.     Nataliia Jurado,

## 2024-05-31 ENCOUNTER — OFFICE VISIT (OUTPATIENT)
Dept: FAMILY MEDICINE CLINIC | Facility: CLINIC | Age: 49
End: 2024-05-31
Payer: COMMERCIAL

## 2024-05-31 VITALS
SYSTOLIC BLOOD PRESSURE: 122 MMHG | HEIGHT: 63 IN | OXYGEN SATURATION: 98 % | WEIGHT: 242 LBS | DIASTOLIC BLOOD PRESSURE: 72 MMHG | BODY MASS INDEX: 42.88 KG/M2 | HEART RATE: 84 BPM

## 2024-05-31 DIAGNOSIS — F41.1 GENERALIZED ANXIETY DISORDER: ICD-10-CM

## 2024-05-31 DIAGNOSIS — R68.89 EPISODES OF DECREASED ATTENTIVENESS: Primary | ICD-10-CM

## 2024-05-31 DIAGNOSIS — F33.1 MODERATE EPISODE OF RECURRENT MAJOR DEPRESSIVE DISORDER: ICD-10-CM

## 2024-05-31 PROCEDURE — 99213 OFFICE O/P EST LOW 20 MIN: CPT | Performed by: STUDENT IN AN ORGANIZED HEALTH CARE EDUCATION/TRAINING PROGRAM

## 2024-05-31 NOTE — PROGRESS NOTES
"Chief Complaint  trouble focusing    Subjective          Crystal Alisa Jeong presents to [facility]  History of Present Illness    Patient states that she is here for discussion of mood and poor focus. She states that this has been present since she was young, but was always told that she was just a little \"off task\" in grade school. She states that this was a bit better in high school and college and there is more structure.  Patient states that she always does significantly better when there is a high level of structure in her day-to-day.    She states that she has always had a thought that she could possibly have ADHD, but has never been evaluated for it in the past.    Objective   Vital Signs:   /72   Pulse 84   Ht 160 cm (63\")   Wt 110 kg (242 lb)   SpO2 98%   BMI 42.87 kg/m²     Body mass index is 42.87 kg/m².    Review of Systems    Past History:  Medical History: has a past medical history of Anxiety, Basal cell carcinoma, Depression, Diverticulosis, Dyspepsia, Dyspnea on exertion, Fatigue, Hypercholesteremia, Hypertension, Joint pain, Morbid obesity, and Thrombocytosis.   Surgical History: has a past surgical history that includes Total abdominal hysterectomy ();  section (); Knee surgery (Left, ); Knee Arthroscopy (Left, ); Wrist fracture surgery (Left, ); Dilation and curettage of uterus (); Diagnostic laparoscopy (); Gastric Sleeve (); Bariatric Surgery (2015); and Colonoscopy ().   Family History: family history includes Breast cancer in her paternal grandmother; Cancer in her mother; Hypertension in her father, maternal grandfather, and mother; Obesity in her father and paternal grandmother.   Social History: reports that she has never smoked. She has never been exposed to tobacco smoke. She has never used smokeless tobacco. She reports that she does not drink alcohol and does not use drugs.      Current " Outpatient Medications:     citalopram (CeleXA) 40 MG tablet, Take 1 tablet by mouth Daily., Disp: 90 tablet, Rfl: 1    topiramate (Topamax) 100 MG tablet, Take 1 tablet by mouth every night at bedtime., Disp: 90 tablet, Rfl: 1    Allergies: Augmentin [amoxicillin-pot clavulanate], Lisinopril, Codeine, and Tape    Physical Exam  Constitutional:       General: She is not in acute distress.     Appearance: She is not ill-appearing or toxic-appearing.   HENT:      Head: Normocephalic and atraumatic.   Cardiovascular:      Rate and Rhythm: Normal rate and regular rhythm.      Heart sounds: No murmur heard.  Pulmonary:      Effort: Pulmonary effort is normal. No respiratory distress.   Musculoskeletal:      Right lower leg: No edema.      Left lower leg: No edema.   Neurological:      General: No focal deficit present.      Mental Status: She is alert and oriented to person, place, and time.   Psychiatric:         Mood and Affect: Mood normal.         Thought Content: Thought content normal.          Result Review :                   Assessment and Plan    Diagnoses and all orders for this visit:    1. Episodes of decreased attentiveness (Primary)  -     Ambulatory Referral to Psychiatry    2. Generalized anxiety disorder  -     Ambulatory Referral to Psychiatry    3. Moderate episode of recurrent major depressive disorder  -     Ambulatory Referral to Psychiatry    Discussed with patient that we will send to psychiatry for evaluation of ADD/ADHD/other mood disorders that could be complicating this.  Recommend follow-up after she is seen by psychiatry or sooner with any new or worsening symptoms.  Continue current medications at current doses.    Follow Up   No follow-ups on file.  Patient was given instructions and counseling regarding her condition or for health maintenance advice. Please see specific information pulled into the AVS if appropriate.     Nataliia Jurado, DO

## 2024-06-24 ENCOUNTER — OFFICE VISIT (OUTPATIENT)
Dept: FAMILY MEDICINE CLINIC | Facility: CLINIC | Age: 49
End: 2024-06-24
Payer: COMMERCIAL

## 2024-06-24 VITALS
HEIGHT: 63 IN | WEIGHT: 242 LBS | OXYGEN SATURATION: 97 % | SYSTOLIC BLOOD PRESSURE: 128 MMHG | HEART RATE: 90 BPM | DIASTOLIC BLOOD PRESSURE: 74 MMHG | BODY MASS INDEX: 42.88 KG/M2

## 2024-06-24 DIAGNOSIS — R30.0 DYSURIA: Primary | ICD-10-CM

## 2024-06-24 LAB
BILIRUB BLD-MCNC: NEGATIVE MG/DL
CLARITY, POC: CLEAR
COLOR UR: YELLOW
EXPIRATION DATE: NORMAL
GLUCOSE UR STRIP-MCNC: NEGATIVE MG/DL
KETONES UR QL: NEGATIVE
LEUKOCYTE EST, POC: NEGATIVE
Lab: NORMAL
NITRITE UR-MCNC: NEGATIVE MG/ML
PH UR: 7 [PH] (ref 5–8)
PROT UR STRIP-MCNC: NEGATIVE MG/DL
RBC # UR STRIP: NEGATIVE /UL
SP GR UR: 1.02 (ref 1–1.03)
UROBILINOGEN UR QL: NORMAL

## 2024-06-24 PROCEDURE — 99213 OFFICE O/P EST LOW 20 MIN: CPT | Performed by: STUDENT IN AN ORGANIZED HEALTH CARE EDUCATION/TRAINING PROGRAM

## 2024-06-24 PROCEDURE — 81003 URINALYSIS AUTO W/O SCOPE: CPT | Performed by: STUDENT IN AN ORGANIZED HEALTH CARE EDUCATION/TRAINING PROGRAM

## 2024-06-24 RX ORDER — NITROFURANTOIN 25; 75 MG/1; MG/1
100 CAPSULE ORAL 2 TIMES DAILY
Qty: 10 CAPSULE | Refills: 0 | Status: SHIPPED | OUTPATIENT
Start: 2024-06-24

## 2024-06-24 NOTE — PROGRESS NOTES
"Chief Complaint  Urinary Tract Infection    Subjective          Lucille Jeong presents to [facility]  Urinary Tract Infection   This is a new problem. The current episode started 1 to 4 weeks ago. The problem occurs intermittently. The quality of the pain is described as burning. The pain is mild. There has been no fever. Associated symptoms include frequency, hesitancy and urgency. Pertinent negatives include no discharge, flank pain, nausea or vomiting. There is no history of recurrent UTIs, urinary stasis or a urological procedure.       Objective   Vital Signs:   /74   Pulse 90   Ht 160 cm (63\")   Wt 110 kg (242 lb)   SpO2 97%   BMI 42.87 kg/m²     Body mass index is 42.87 kg/m².    Review of Systems   Gastrointestinal:  Negative for nausea and vomiting.   Genitourinary:  Positive for frequency, hesitancy and urgency. Negative for flank pain.       Past History:  Medical History: has a past medical history of Anxiety, Basal cell carcinoma, Depression, Diverticulosis, Dyspepsia, Dyspnea on exertion, Fatigue, Hypercholesteremia, Hypertension, Joint pain, Morbid obesity, and Thrombocytosis.   Surgical History: has a past surgical history that includes Total abdominal hysterectomy ();  section (); Knee surgery (Left, ); Knee Arthroscopy (Left, ); Wrist fracture surgery (Left, ); Dilation and curettage of uterus (); Diagnostic laparoscopy (); Gastric Sleeve (); Bariatric Surgery (2015); and Colonoscopy ().   Family History: family history includes Breast cancer in her paternal grandmother; Cancer in her mother; Hypertension in her father, maternal grandfather, and mother; Obesity in her father and paternal grandmother.   Social History: reports that she has never smoked. She has never been exposed to tobacco smoke. She has never used smokeless tobacco. She reports that she does not drink alcohol and does not use " drugs.      Current Outpatient Medications:     citalopram (CeleXA) 40 MG tablet, Take 1 tablet by mouth Daily., Disp: 90 tablet, Rfl: 1    nitrofurantoin, macrocrystal-monohydrate, (Macrobid) 100 MG capsule, Take 1 capsule by mouth 2 (Two) Times a Day., Disp: 10 capsule, Rfl: 0    topiramate (Topamax) 100 MG tablet, Take 1 tablet by mouth every night at bedtime., Disp: 90 tablet, Rfl: 1    Allergies: Augmentin [amoxicillin-pot clavulanate], Lisinopril, Codeine, and Tape    Physical Exam  Constitutional:       General: She is not in acute distress.     Appearance: She is not toxic-appearing.   Cardiovascular:      Rate and Rhythm: Normal rate and regular rhythm.   Pulmonary:      Effort: Pulmonary effort is normal.      Breath sounds: Normal breath sounds.   Abdominal:      General: Abdomen is flat.      Palpations: Abdomen is soft.      Tenderness: There is abdominal tenderness (suprapubic). There is no right CVA tenderness or left CVA tenderness.   Neurological:      Mental Status: She is alert.   Psychiatric:         Mood and Affect: Mood normal.          Result Review :                   Assessment and Plan    Diagnoses and all orders for this visit:    1. Dysuria (Primary)  -     POC Urinalysis Dipstick, Automated  -     Urine Culture - Urine, Urine, Clean Catch    Other orders  -     nitrofurantoin, macrocrystal-monohydrate, (Macrobid) 100 MG capsule; Take 1 capsule by mouth 2 (Two) Times a Day.  Dispense: 10 capsule; Refill: 0    NoUTI on dip, but with classic symptoms. Culture sent. Will follow results. Will treat with Macrobid until culture results are back. Call with worsening symptoms or medication intolerance.      Follow Up   No follow-ups on file.  Patient was given instructions and counseling regarding her condition or for health maintenance advice. Please see specific information pulled into the AVS if appropriate.     Nataliia Jurado,

## 2024-06-26 LAB
BACTERIA UR CULT: NORMAL
BACTERIA UR CULT: NORMAL

## 2024-07-03 ENCOUNTER — TELEPHONE (OUTPATIENT)
Dept: FAMILY MEDICINE CLINIC | Facility: CLINIC | Age: 49
End: 2024-07-03
Payer: COMMERCIAL

## 2024-07-03 NOTE — TELEPHONE ENCOUNTER
"HUB TO REALLY--    \"  Dr Jurado office is trying to contact you regarding overdue labs that havent been completed. Labs from 4/7/2024... Would you like to still have these completed? \"  "

## 2024-07-25 ENCOUNTER — TELEPHONE (OUTPATIENT)
Dept: FAMILY MEDICINE CLINIC | Facility: CLINIC | Age: 49
End: 2024-07-25
Payer: COMMERCIAL

## 2024-08-13 ENCOUNTER — TELEPHONE (OUTPATIENT)
Dept: FAMILY MEDICINE CLINIC | Facility: CLINIC | Age: 49
End: 2024-08-13
Payer: COMMERCIAL

## 2024-08-13 NOTE — TELEPHONE ENCOUNTER
LVM regarding overdue labs that need to be completed. Advised pt to make a lab appointment to have these completed.

## 2024-08-24 ENCOUNTER — TELEPHONE (OUTPATIENT)
Dept: FAMILY MEDICINE CLINIC | Facility: CLINIC | Age: 49
End: 2024-08-24
Payer: COMMERCIAL

## 2024-12-17 ENCOUNTER — OFFICE VISIT (OUTPATIENT)
Dept: FAMILY MEDICINE CLINIC | Facility: CLINIC | Age: 49
End: 2024-12-17
Payer: COMMERCIAL

## 2024-12-17 VITALS
SYSTOLIC BLOOD PRESSURE: 138 MMHG | OXYGEN SATURATION: 98 % | HEART RATE: 90 BPM | WEIGHT: 238 LBS | DIASTOLIC BLOOD PRESSURE: 84 MMHG | BODY MASS INDEX: 42.17 KG/M2 | HEIGHT: 63 IN

## 2024-12-17 DIAGNOSIS — B37.2 YEAST INFECTION OF THE SKIN: Primary | ICD-10-CM

## 2024-12-17 PROCEDURE — 99213 OFFICE O/P EST LOW 20 MIN: CPT | Performed by: STUDENT IN AN ORGANIZED HEALTH CARE EDUCATION/TRAINING PROGRAM

## 2024-12-17 RX ORDER — KETOCONAZOLE 20 MG/G
1 CREAM TOPICAL DAILY
Qty: 60 G | Refills: 1 | Status: SHIPPED | OUTPATIENT
Start: 2024-12-17

## 2024-12-17 RX ORDER — FLUCONAZOLE 150 MG/1
150 TABLET ORAL
Qty: 2 TABLET | Refills: 0 | Status: SHIPPED | OUTPATIENT
Start: 2024-12-17

## 2024-12-17 RX ORDER — NYSTATIN 100000 U/G
CREAM TOPICAL
COMMUNITY
Start: 2024-10-17

## 2024-12-17 RX ORDER — TRIAMCINOLONE ACETONIDE 0.25 MG/G
CREAM TOPICAL
COMMUNITY
Start: 2024-10-17

## 2024-12-17 NOTE — PROGRESS NOTES
"Chief Complaint  Rash (Rash under the breasts. She asked her dermatologist about it, they prescribed triamcinolone and nystatin. It's not helping.)    Subjective          Crystal Alisa Jeong presents to [facility]  History of Present Illness    Presents the office today for rash under the breast.  She states that this has been there for the past several months, but does seem to wax and wane.  She has been using both nystatin and triamcinolone on it without any significant improvement in symptoms.  She states that the rash is itchy, and bothersome to her especially when she is wearing a bra.    Objective   Vital Signs:   /84   Pulse 90   Ht 160 cm (63\")   Wt 108 kg (238 lb)   SpO2 98%   BMI 42.16 kg/m²     Body mass index is 42.16 kg/m².    Review of Systems    Past History:  Medical History: has a past medical history of Anxiety, Basal cell carcinoma, Depression, Diverticulosis, Dyspepsia, Dyspnea on exertion, Fatigue, Hypercholesteremia, Hypertension, Joint pain, Morbid obesity, and Thrombocytosis.   Surgical History: has a past surgical history that includes Total abdominal hysterectomy ();  section (); Knee surgery (Left, ); Knee Arthroscopy (Left, ); Wrist fracture surgery (Left, ); Dilation and curettage of uterus (); Diagnostic laparoscopy (); Gastric Sleeve (); Bariatric Surgery (2015); and Colonoscopy ().   Family History: family history includes Breast cancer in her paternal grandmother; Cancer in her mother; Hypertension in her father, maternal grandfather, and mother; Obesity in her father and paternal grandmother.   Social History: reports that she has never smoked. She has never been exposed to tobacco smoke. She has never used smokeless tobacco. She reports that she does not drink alcohol and does not use drugs.      Current Outpatient Medications:     nystatin (MYCOSTATIN) 348702 UNIT/GM cream, APPLY TO " THE AFFECTED AREA(S) BY TOPICAL ROUTE 2 TIMES PER DAY UNTIL BETTER, Disp: , Rfl:     triamcinolone (KENALOG) 0.025 % cream, APPLY A THIN LAYER TO THE AFFECTED AREA(S) BY TOPICAL ROUTE 2 TIMES PER DAY UNTIL BETTER, Disp: , Rfl:     citalopram (CeleXA) 40 MG tablet, Take 1 tablet by mouth Daily., Disp: 90 tablet, Rfl: 1    fluconazole (Diflucan) 150 MG tablet, Take 1 tablet by mouth Every 3 (Three) Days., Disp: 2 tablet, Rfl: 0    ketoconazole (NIZORAL) 2 % cream, Apply 1 Application topically to the appropriate area as directed Daily., Disp: 60 g, Rfl: 1    nitrofurantoin, macrocrystal-monohydrate, (Macrobid) 100 MG capsule, Take 1 capsule by mouth 2 (Two) Times a Day., Disp: 10 capsule, Rfl: 0    topiramate (Topamax) 100 MG tablet, Take 1 tablet by mouth every night at bedtime., Disp: 90 tablet, Rfl: 1    Allergies: Augmentin [amoxicillin-pot clavulanate], Lisinopril, Codeine, and Tape    Physical Exam  Constitutional:       General: She is not in acute distress.     Appearance: She is not ill-appearing or toxic-appearing.   HENT:      Head: Normocephalic and atraumatic.   Pulmonary:      Effort: Pulmonary effort is normal. No respiratory distress.   Skin:     Comments: Erythematous, raised excoriated rash on the underside of both breasts and on the chest wall below.  Satellite lesions noted.   Neurological:      General: No focal deficit present.      Mental Status: She is alert and oriented to person, place, and time.   Psychiatric:         Mood and Affect: Mood normal.         Thought Content: Thought content normal.          Result Review :                   Assessment and Plan    Diagnoses and all orders for this visit:    1. Yeast infection of the skin (Primary)    Other orders  -     fluconazole (Diflucan) 150 MG tablet; Take 1 tablet by mouth Every 3 (Three) Days.  Dispense: 2 tablet; Refill: 0  -     ketoconazole (NIZORAL) 2 % cream; Apply 1 Application topically to the appropriate area as directed Daily.   Dispense: 60 g; Refill: 1    Will do Diflucan 1 tablet every 3 days x 2 and use ketoconazole cream to hopefully clear yeast infection of the skin.  Advised if she is not having significant improvement to contact the office or reach out to her dermatologist.    Follow Up   No follow-ups on file.  Patient was given instructions and counseling regarding her condition or for health maintenance advice. Please see specific information pulled into the AVS if appropriate.     Nataliia Jurado, DO

## 2024-12-31 ENCOUNTER — OFFICE VISIT (OUTPATIENT)
Dept: FAMILY MEDICINE CLINIC | Facility: CLINIC | Age: 49
End: 2024-12-31
Payer: COMMERCIAL

## 2024-12-31 VITALS
WEIGHT: 236 LBS | BODY MASS INDEX: 41.82 KG/M2 | HEIGHT: 63 IN | SYSTOLIC BLOOD PRESSURE: 126 MMHG | DIASTOLIC BLOOD PRESSURE: 74 MMHG | HEART RATE: 82 BPM

## 2024-12-31 DIAGNOSIS — B37.2 YEAST INFECTION OF THE SKIN: Primary | ICD-10-CM

## 2024-12-31 PROCEDURE — 99213 OFFICE O/P EST LOW 20 MIN: CPT | Performed by: STUDENT IN AN ORGANIZED HEALTH CARE EDUCATION/TRAINING PROGRAM

## 2024-12-31 RX ORDER — TRIAMCINOLONE ACETONIDE 0.25 MG/G
CREAM TOPICAL 2 TIMES DAILY
Start: 2024-12-31

## 2024-12-31 NOTE — PROGRESS NOTES
"Chief Complaint  Rash (Still having the same rash)    Subjective          Lucille Jeong presents to [facility]  History of Present Illness    Patient presents to the office for follow up on the rash under her breast. She states that she has been using the medications and states that this has helped some, but it is still inflamed and itchy.     Objective   Vital Signs:   /74   Pulse 82   Ht 160 cm (63\")   Wt 107 kg (236 lb)   BMI 41.81 kg/m²     Body mass index is 41.81 kg/m².    Review of Systems    Past History:  Medical History: has a past medical history of Anxiety, Basal cell carcinoma, Depression, Diverticulosis, Dyspepsia, Dyspnea on exertion, Fatigue, Hypercholesteremia, Hypertension, Joint pain, Morbid obesity, and Thrombocytosis.   Surgical History: has a past surgical history that includes Total abdominal hysterectomy ();  section (); Knee surgery (Left, ); Knee Arthroscopy (Left, ); Wrist fracture surgery (Left, ); Dilation and curettage of uterus (); Diagnostic laparoscopy (); Gastric Sleeve (); Bariatric Surgery (2015); and Colonoscopy ().   Family History: family history includes Breast cancer in her paternal grandmother; Cancer in her mother; Hypertension in her father, maternal grandfather, and mother; Obesity in her father and paternal grandmother.   Social History: reports that she has never smoked. She has never been exposed to tobacco smoke. She has never used smokeless tobacco. She reports that she does not drink alcohol and does not use drugs.      Current Outpatient Medications:     triamcinolone (KENALOG) 0.025 % cream, Apply  topically to the appropriate area as directed 2 (Two) Times a Day., Disp: , Rfl:     citalopram (CeleXA) 40 MG tablet, Take 1 tablet by mouth Daily., Disp: 90 tablet, Rfl: 1    ketoconazole (NIZORAL) 2 % cream, Apply 1 Application topically to the appropriate area as " directed Daily., Disp: 60 g, Rfl: 1    nystatin (MYCOSTATIN) [id number] UNIT/GM cream, APPLY TO THE AFFECTED AREA(S) BY TOPICAL ROUTE 2 TIMES PER DAY UNTIL BETTER, Disp: , Rfl:     topiramate (Topamax) 100 MG tablet, Take 1 tablet by mouth every night at bedtime., Disp: 90 tablet, Rfl: 1    Allergies: Augmentin [amoxicillin-pot clavulanate], Lisinopril, Codeine, and Tape    Physical Exam  Constitutional:       General: She is not in acute distress.     Appearance: She is not ill-appearing or toxic-appearing.   HENT:      Head: Normocephalic and atraumatic.   Cardiovascular:      Rate and Rhythm: Normal rate and regular rhythm.      Heart sounds: No murmur heard.  Pulmonary:      Effort: Pulmonary effort is normal. No respiratory distress.   Skin:     Comments: Area under the breast with significant improvement in the erythema and excoriations.    Neurological:      General: No focal deficit present.      Mental Status: She is alert and oriented to person, place, and time.   Psychiatric:         Mood and Affect: Mood normal.         Thought Content: Thought content normal.          Result Review :                   Assessment and Plan    Diagnoses and all orders for this visit:    1. Yeast infection of the skin (Primary)    Other orders  -     triamcinolone (KENALOG) 0.025 % cream; Apply  topically to the appropriate area as directed 2 (Two) Times a Day.    Recommend that patient continue to use the ketoconazole cream daily for the next week and add triamcinolone twice daily for remaining irritation and itching.  Advised that if she does not have resolution of symptoms over the next week to contact the office and we will discuss next steps.    Follow Up   No follow-ups on file.  Patient was given instructions and counseling regarding her condition or for health maintenance advice. Please see specific information pulled into the AVS if appropriate.     Nataliia Jurado, DO

## 2025-02-24 ENCOUNTER — OFFICE VISIT (OUTPATIENT)
Dept: FAMILY MEDICINE CLINIC | Facility: CLINIC | Age: 50
End: 2025-02-24
Payer: COMMERCIAL

## 2025-02-24 VITALS
BODY MASS INDEX: 41.82 KG/M2 | SYSTOLIC BLOOD PRESSURE: 128 MMHG | WEIGHT: 236 LBS | OXYGEN SATURATION: 97 % | DIASTOLIC BLOOD PRESSURE: 80 MMHG | HEART RATE: 100 BPM | HEIGHT: 63 IN

## 2025-02-24 DIAGNOSIS — J40 BRONCHITIS: Primary | ICD-10-CM

## 2025-02-24 PROCEDURE — 99213 OFFICE O/P EST LOW 20 MIN: CPT | Performed by: STUDENT IN AN ORGANIZED HEALTH CARE EDUCATION/TRAINING PROGRAM

## 2025-02-24 RX ORDER — BROMPHENIRAMINE MALEATE, PSEUDOEPHEDRINE HYDROCHLORIDE, AND DEXTROMETHORPHAN HYDROBROMIDE 2; 30; 10 MG/5ML; MG/5ML; MG/5ML
5 SYRUP ORAL 4 TIMES DAILY PRN
Qty: 240 ML | Refills: 0 | Status: SHIPPED | OUTPATIENT
Start: 2025-02-24

## 2025-02-24 RX ORDER — CEFDINIR 300 MG/1
300 CAPSULE ORAL 2 TIMES DAILY
Qty: 14 CAPSULE | Refills: 0 | Status: SHIPPED | OUTPATIENT
Start: 2025-02-24

## 2025-02-24 NOTE — PROGRESS NOTES
"Chief Complaint  URI (Cough x4 weeks)    Subjective          Crystal Alisa Jeong presents to [facility]  URI   This is a new problem. The current episode started 1 to 4 weeks ago. The problem has been gradually worsening. There has been no fever. Associated symptoms include chest pain, congestion, coughing, ear pain, headaches, rhinorrhea, a sore throat and wheezing. Pertinent negatives include no rash. She has tried acetaminophen, antihistamine and decongestant for the symptoms. The treatment provided mild relief.   She states that she originally thought that it was viral illness, but has not gotten over it completely.         Objective   Vital Signs:   /80   Pulse 100   Ht 160 cm (63\")   Wt 107 kg (236 lb)   SpO2 97%   BMI 41.81 kg/m²     Body mass index is 41.81 kg/m².    Review of Systems   Constitutional:  Negative for chills, fever and unexpected weight loss.   HENT:  Positive for congestion, ear pain, postnasal drip, rhinorrhea and sore throat.    Respiratory:  Positive for cough, shortness of breath and wheezing.    Cardiovascular:  Positive for chest pain.   Musculoskeletal:  Negative for myalgias.   Skin:  Negative for rash.       Past History:  Medical History: has a past medical history of Anxiety, Basal cell carcinoma, Depression, Diverticulosis, Dyspepsia, Dyspnea on exertion, Fatigue, Hypercholesteremia, Hypertension, Joint pain, Morbid obesity, and Thrombocytosis.   Surgical History: has a past surgical history that includes Total abdominal hysterectomy ();  section (); Knee surgery (Left, ); Knee Arthroscopy (Left, ); Wrist fracture surgery (Left, ); Dilation and curettage of uterus (); Diagnostic laparoscopy (); Gastric Sleeve (); Bariatric Surgery (2015); and Colonoscopy ().   Family History: family history includes Breast cancer in her paternal grandmother; Cancer in her mother; Hypertension in her " father, maternal grandfather, and mother; Obesity in her father and paternal grandmother.   Social History: reports that she has never smoked. She has never been exposed to tobacco smoke. She has never used smokeless tobacco. She reports that she does not drink alcohol and does not use drugs.      Current Outpatient Medications:     brompheniramine-pseudoephedrine-DM 30-2-10 MG/5ML syrup, Take 5 mL by mouth 4 (Four) Times a Day As Needed for Allergies., Disp: 240 mL, Rfl: 0    cefdinir (OMNICEF) 300 MG capsule, Take 1 capsule by mouth 2 (Two) Times a Day., Disp: 14 capsule, Rfl: 0    citalopram (CeleXA) 40 MG tablet, Take 1 tablet by mouth Daily., Disp: 90 tablet, Rfl: 1    ketoconazole (NIZORAL) 2 % cream, Apply 1 Application topically to the appropriate area as directed Daily., Disp: 60 g, Rfl: 1    nystatin (MYCOSTATIN) 257430 UNIT/GM cream, APPLY TO THE AFFECTED AREA(S) BY TOPICAL ROUTE 2 TIMES PER DAY UNTIL BETTER, Disp: , Rfl:     topiramate (Topamax) 100 MG tablet, Take 1 tablet by mouth every night at bedtime., Disp: 90 tablet, Rfl: 1    triamcinolone (KENALOG) 0.025 % cream, Apply  topically to the appropriate area as directed 2 (Two) Times a Day., Disp: , Rfl:     Allergies: Augmentin [amoxicillin-pot clavulanate], Lisinopril, Codeine, and Tape    Physical Exam  Constitutional:       General: She is not in acute distress.     Appearance: She is not ill-appearing or toxic-appearing.   HENT:      Head: Normocephalic and atraumatic.      Right Ear: Ear canal and external ear normal.      Left Ear: Ear canal and external ear normal.      Nose: Congestion and rhinorrhea present.      Mouth/Throat:      Pharynx: Posterior oropharyngeal erythema (cobblestoning) present.   Eyes:      General: No scleral icterus.  Cardiovascular:      Rate and Rhythm: Normal rate and regular rhythm.   Pulmonary:      Effort: Pulmonary effort is normal.      Breath sounds: Normal breath sounds.   Musculoskeletal:      Right lower  leg: No edema.      Left lower leg: No edema.   Neurological:      Mental Status: She is alert.          Result Review :                   Assessment and Plan    Diagnoses and all orders for this visit:    1. Bronchitis (Primary)    Other orders  -     cefdinir (OMNICEF) 300 MG capsule; Take 1 capsule by mouth 2 (Two) Times a Day.  Dispense: 14 capsule; Refill: 0  -     brompheniramine-pseudoephedrine-DM 30-2-10 MG/5ML syrup; Take 5 mL by mouth 4 (Four) Times a Day As Needed for Allergies.  Dispense: 240 mL; Refill: 0    Will treat with cefdinir and Bromfed for symptoms. Tylenol/Motrin for pain/fever. OTC cough and cold medication for symptoms. Nasal saline spray recommended. Cool mist humidifier at the bedside. RTC with new or worsening symptoms.      Follow Up   No follow-ups on file.  Patient was given instructions and counseling regarding her condition or for health maintenance advice. Please see specific information pulled into the AVS if appropriate.     Nataliia Jurado, DO

## 2025-05-06 ENCOUNTER — OFFICE VISIT (OUTPATIENT)
Dept: FAMILY MEDICINE CLINIC | Facility: CLINIC | Age: 50
End: 2025-05-06
Payer: COMMERCIAL

## 2025-05-06 VITALS
HEIGHT: 63 IN | BODY MASS INDEX: 41.29 KG/M2 | WEIGHT: 233 LBS | SYSTOLIC BLOOD PRESSURE: 140 MMHG | OXYGEN SATURATION: 97 % | HEART RATE: 97 BPM | DIASTOLIC BLOOD PRESSURE: 80 MMHG

## 2025-05-06 DIAGNOSIS — R42 DIZZINESS: ICD-10-CM

## 2025-05-06 DIAGNOSIS — Z12.31 ENCOUNTER FOR SCREENING MAMMOGRAM FOR MALIGNANT NEOPLASM OF BREAST: ICD-10-CM

## 2025-05-06 DIAGNOSIS — E55.9 VITAMIN D DEFICIENCY: ICD-10-CM

## 2025-05-06 DIAGNOSIS — H53.9 VISUAL CHANGES: ICD-10-CM

## 2025-05-06 DIAGNOSIS — Z00.00 WELL ADULT EXAM: Primary | ICD-10-CM

## 2025-05-06 RX ORDER — CITALOPRAM HYDROBROMIDE 40 MG/1
40 TABLET ORAL DAILY
Qty: 30 TABLET | Refills: 0 | Status: SHIPPED | OUTPATIENT
Start: 2025-05-06
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
## 2025-05-07 ENCOUNTER — RESULTS FOLLOW-UP (OUTPATIENT)
Dept: FAMILY MEDICINE CLINIC | Facility: CLINIC | Age: 50
End: 2025-05-07
Payer: COMMERCIAL

## 2025-05-07 LAB
25(OH)D3+25(OH)D2 SERPL-MCNC: 5.4 NG/ML (ref 30–100)
ALBUMIN SERPL-MCNC: 4.2 G/DL (ref 3.9–4.9)
ALP SERPL-CCNC: 82 IU/L (ref 44–121)
ALT SERPL-CCNC: 19 IU/L (ref 0–32)
AST SERPL-CCNC: 23 IU/L (ref 0–40)
BASOPHILS # BLD AUTO: 0.1 X10E3/UL (ref 0–0.2)
BASOPHILS NFR BLD AUTO: 1 %
BILIRUB SERPL-MCNC: 0.4 MG/DL (ref 0–1.2)
BUN SERPL-MCNC: 11 MG/DL (ref 6–24)
BUN/CREAT SERPL: 17 (ref 9–23)
CALCIUM SERPL-MCNC: 8.8 MG/DL (ref 8.7–10.2)
CHLORIDE SERPL-SCNC: 105 MMOL/L (ref 96–106)
CHOLEST SERPL-MCNC: 276 MG/DL (ref 100–199)
CO2 SERPL-SCNC: 24 MMOL/L (ref 20–29)
CREAT SERPL-MCNC: 0.64 MG/DL (ref 0.57–1)
EGFRCR SERPLBLD CKD-EPI 2021: 110 ML/MIN/1.73
EOSINOPHIL # BLD AUTO: 0.1 X10E3/UL (ref 0–0.4)
EOSINOPHIL NFR BLD AUTO: 1 %
ERYTHROCYTE [DISTWIDTH] IN BLOOD BY AUTOMATED COUNT: 13 % (ref 11.7–15.4)
GLOBULIN SER CALC-MCNC: 1.9 G/DL (ref 1.5–4.5)
GLUCOSE SERPL-MCNC: 86 MG/DL (ref 70–99)
HBA1C MFR BLD: 5.6 % (ref 4.8–5.6)
HCT VFR BLD AUTO: 45.6 % (ref 34–46.6)
HDLC SERPL-MCNC: 65 MG/DL
HGB BLD-MCNC: 14.3 G/DL (ref 11.1–15.9)
IMM GRANULOCYTES # BLD AUTO: 0 X10E3/UL (ref 0–0.1)
IMM GRANULOCYTES NFR BLD AUTO: 0 %
LDLC SERPL CALC-MCNC: 186 MG/DL (ref 0–99)
LYMPHOCYTES # BLD AUTO: 1.9 X10E3/UL (ref 0.7–3.1)
LYMPHOCYTES NFR BLD AUTO: 27 %
MCH RBC QN AUTO: 28.5 PG (ref 26.6–33)
MCHC RBC AUTO-ENTMCNC: 31.4 G/DL (ref 31.5–35.7)
MCV RBC AUTO: 91 FL (ref 79–97)
MONOCYTES # BLD AUTO: 0.7 X10E3/UL (ref 0.1–0.9)
MONOCYTES NFR BLD AUTO: 10 %
NEUTROPHILS # BLD AUTO: 4.2 X10E3/UL (ref 1.4–7)
NEUTROPHILS NFR BLD AUTO: 61 %
PLATELET # BLD AUTO: 424 X10E3/UL (ref 150–450)
POTASSIUM SERPL-SCNC: 4.1 MMOL/L (ref 3.5–5.2)
PROT SERPL-MCNC: 6.1 G/DL (ref 6–8.5)
RBC # BLD AUTO: 5.02 X10E6/UL (ref 3.77–5.28)
SODIUM SERPL-SCNC: 145 MMOL/L (ref 134–144)
T4 FREE SERPL-MCNC: 1.2 NG/DL (ref 0.82–1.77)
TRIGL SERPL-MCNC: 139 MG/DL (ref 0–149)
TSH SERPL DL<=0.005 MIU/L-ACNC: 2.05 UIU/ML (ref 0.45–4.5)
VLDLC SERPL CALC-MCNC: 25 MG/DL (ref 5–40)
WBC # BLD AUTO: 6.9 X10E3/UL (ref 3.4–10.8)

## 2025-05-07 RX ORDER — ROSUVASTATIN CALCIUM 5 MG/1
5 TABLET, COATED ORAL DAILY
Qty: 90 TABLET | Refills: 1 | Status: SHIPPED | OUTPATIENT
Start: 2025-05-07

## 2025-05-23 ENCOUNTER — OFFICE VISIT (OUTPATIENT)
Dept: FAMILY MEDICINE CLINIC | Facility: CLINIC | Age: 50
End: 2025-05-23
Payer: COMMERCIAL

## 2025-05-23 VITALS
DIASTOLIC BLOOD PRESSURE: 86 MMHG | HEIGHT: 63 IN | OXYGEN SATURATION: 97 % | WEIGHT: 237 LBS | HEART RATE: 119 BPM | BODY MASS INDEX: 41.99 KG/M2 | SYSTOLIC BLOOD PRESSURE: 128 MMHG

## 2025-05-23 DIAGNOSIS — H61.23 BILATERAL IMPACTED CERUMEN: Primary | ICD-10-CM

## 2025-05-23 NOTE — PROGRESS NOTES
".Chief Complaint  Ear Fullness (Rt ear fullness, unable to hear/clogged up. )    Subjective          History of Present Illness  Lucille Jeong is here today with her  History of Present Illness  The patient presents for evaluation of right ear pain.    She reports experiencing discomfort in her right ear, which she attributes to the use of Debrox. She initiated the use of Debrox on 05/19/2025, adhering to the instructions on the packaging to use it for a duration of 4 days, followed by an ear flush. However, she did not observe any improvement post-flush. Currently, she describes her ear as being completely clogged, resulting in significant hearing impairment. A few weeks prior, she had a consultation with Dr. Jurado who was unable to visualize her eardrum due to cerumen impaction. Dr. Jurado recommended the use of Debrox, but due to her travel commitments, she deferred the treatment until her return. She also recalls a similar incident approximately 10 years ago when she used Debrox in both ears simultaneously, leading to temporary bilateral hearing loss.    Objective   Vital Signs:   /86   Pulse 119   Ht 160 cm (63\")   Wt 108 kg (237 lb)   SpO2 97%   BMI 41.98 kg/m²     Body mass index is 41.98 kg/m².  Class 3 Severe Obesity (BMI >=40). Obesity-related health conditions include the following: dyslipidemias. Obesity is improving with treatment. BMI is is above average; BMI management plan is completed. We discussed Information on healthy weight added to patient's after visit summary.      Review of Systems      Current Outpatient Medications:     citalopram (CeleXA) 40 MG tablet, Take 1 tablet by mouth Daily., Disp: 30 tablet, Rfl: 0    ketoconazole (NIZORAL) 2 % cream, Apply 1 Application topically to the appropriate area as directed Daily., Disp: 60 g, Rfl: 1    nystatin (MYCOSTATIN) 604367 UNIT/GM cream, APPLY TO THE AFFECTED AREA(S) BY TOPICAL ROUTE 2 TIMES PER DAY UNTIL BETTER, Disp: , " Rfl:     rosuvastatin (Crestor) 5 MG tablet, Take 1 tablet by mouth Daily., Disp: 90 tablet, Rfl: 1    topiramate (Topamax) 100 MG tablet, Take 1 tablet by mouth every night at bedtime., Disp: 90 tablet, Rfl: 1    triamcinolone (KENALOG) 0.025 % cream, Apply  topically to the appropriate area as directed 2 (Two) Times a Day., Disp: , Rfl:     Allergies: Augmentin [amoxicillin-pot clavulanate], Lisinopril, Codeine, and Tape    Physical Exam  Vitals and nursing note reviewed.   Constitutional:       General: She is not in acute distress.     Appearance: Normal appearance. She is not ill-appearing, toxic-appearing or diaphoretic.   HENT:      Head: Normocephalic and atraumatic.      Right Ear: There is impacted cerumen.      Left Ear: There is impacted cerumen.      Ears:      Comments: After irrigation- no obstruction TM clear Michael  Cardiovascular:      Rate and Rhythm: Normal rate and regular rhythm.      Heart sounds: Normal heart sounds.   Pulmonary:      Effort: Pulmonary effort is normal.      Breath sounds: Normal breath sounds.   Neurological:      General: No focal deficit present.      Mental Status: She is alert.   Psychiatric:         Mood and Affect: Mood normal.         Behavior: Behavior normal.      Physical Exam  Ears: Cerumen impaction noted in the right ear, cerumen soft.    Result Review :          Results      Ear Cerumen Removal    Date/Time: 5/23/2025 12:17 PM    Performed by: Stacy Niño PA  Authorized by: Stacy Niño PA    Anesthesia:  Local Anesthetic: none  Location details: left ear and right ear  Patient tolerance: patient tolerated the procedure well with no immediate complications  Procedure type: irrigation   Sedation:  Patient sedated: no              Assessment and Plan    Diagnoses and all orders for this visit:    1. Bilateral impacted cerumen (Primary)      Assessment & Plan    - Reports right ear is completely clogged and hearing is impaired after using Debrox as  recommended.  - Examination reveals a significant amount of soft cerumen obstructing the view of the eardrum.  - Discussed the condition and previous similar experiences; recommended ear irrigation.  Ear irrigation dislodged cerumen from noth ears and hearing now back to normal      Follow Up   No follow-ups on file.  Patient was given instructions and counseling regarding her condition or for health maintenance advice. Please see specific information pulled into the AVS if appropriate.     Patient or patient representative verbalized consent for the use of Ambient Listening during the visit with  KAYLIN Valles for chart documentation. 5/23/2025  12:17 EDT    KAYLIN Valles  05/23/2025

## 2025-06-27 ENCOUNTER — OFFICE VISIT (OUTPATIENT)
Dept: FAMILY MEDICINE CLINIC | Facility: CLINIC | Age: 50
End: 2025-06-27
Payer: COMMERCIAL

## 2025-06-27 VITALS
HEART RATE: 92 BPM | WEIGHT: 241 LBS | OXYGEN SATURATION: 96 % | HEIGHT: 63 IN | BODY MASS INDEX: 42.7 KG/M2 | DIASTOLIC BLOOD PRESSURE: 100 MMHG | SYSTOLIC BLOOD PRESSURE: 158 MMHG

## 2025-06-27 DIAGNOSIS — F41.1 GENERALIZED ANXIETY DISORDER: ICD-10-CM

## 2025-06-27 DIAGNOSIS — E66.01 MORBID (SEVERE) OBESITY DUE TO EXCESS CALORIES: Primary | ICD-10-CM

## 2025-06-27 NOTE — PROGRESS NOTES
"Chief Complaint  Follow-up (Wants to discuss weightloss options and changing celexa )    Subjective          Lucille Jeong presents to [facility]  History of Present Illness    Patient is here to talk about options for her mood. She states that she has been off of the celexa for a few weeks, and she has been having trouble at home.  She states that she has been having difficulty with mood as well as weight management.  She states that she would like to avoid knee injections if possible, would like to discuss something that will help with weight management and a pill form.    Patient states that she has been on Wellbutrin in the past and this is done okay for her mood.    Objective   Vital Signs:   /100   Pulse 92   Ht 160 cm (63\")   Wt 109 kg (241 lb)   SpO2 96%   BMI 42.69 kg/m²     Body mass index is 42.69 kg/m².    Review of Systems    Past History:  Medical History: has a past medical history of Anxiety, Basal cell carcinoma, Depression, Diverticulosis, Dyspepsia, Dyspnea on exertion, Fatigue, Hypercholesteremia, Hypertension, Joint pain, Morbid obesity, and Thrombocytosis.   Surgical History: has a past surgical history that includes Total abdominal hysterectomy ();  section (); Knee surgery (Left, ); Knee Arthroscopy (Left, ); Wrist fracture surgery (Left, ); Dilation and curettage of uterus (); Diagnostic laparoscopy (); Gastric Sleeve (); Bariatric Surgery (2015); and Colonoscopy ().   Family History: family history includes Breast cancer in her paternal grandmother; Cancer in her mother; Hypertension in her father, maternal grandfather, and mother; Obesity in her father and paternal grandmother.   Social History: reports that she has never smoked. She has never been exposed to tobacco smoke. She has never used smokeless tobacco. She reports that she does not drink alcohol and does not use " drugs.      Current Outpatient Medications:     nystatin (MYCOSTATIN) 880378 UNIT/GM cream, APPLY TO THE AFFECTED AREA(S) BY TOPICAL ROUTE 2 TIMES PER DAY UNTIL BETTER, Disp: , Rfl:     rosuvastatin (Crestor) 5 MG tablet, Take 1 tablet by mouth Daily., Disp: 90 tablet, Rfl: 1    triamcinolone (KENALOG) 0.025 % cream, Apply  topically to the appropriate area as directed 2 (Two) Times a Day., Disp: , Rfl:     naltrexone-bupropion ER (CONTRAVE) 8-90 MG tablet, Wk 1: 1 tab daily, Wk 2: 1 tab twice a day, Wk 3: 2 tabs in AM, 1 tab in PM, Wk 4: 2 tabs twice a day, Maintenance dose: 2 tabs twice daily., Disp: 120 tablet, Rfl: 0    Allergies: Augmentin [amoxicillin-pot clavulanate], Lisinopril, Codeine, and Tape    Physical Exam  Constitutional:       General: She is not in acute distress.     Appearance: She is not ill-appearing or toxic-appearing.   HENT:      Head: Normocephalic and atraumatic.   Cardiovascular:      Rate and Rhythm: Normal rate and regular rhythm.      Heart sounds: No murmur heard.  Pulmonary:      Effort: Pulmonary effort is normal. No respiratory distress.   Neurological:      General: No focal deficit present.      Mental Status: She is alert and oriented to person, place, and time.   Psychiatric:         Mood and Affect: Mood normal.         Thought Content: Thought content normal.          Result Review :                   Assessment and Plan    Diagnoses and all orders for this visit:    1. Morbid (severe) obesity due to excess calories (Primary)    2. Generalized anxiety disorder    Other orders  -     naltrexone-bupropion ER (CONTRAVE) 8-90 MG tablet; Wk 1: 1 tab daily, Wk 2: 1 tab twice a day, Wk 3: 2 tabs in AM, 1 tab in PM, Wk 4: 2 tabs twice a day, Maintenance dose: 2 tabs twice daily.  Dispense: 120 tablet; Refill: 0    Discussed with patient that we can do Contrave for weight management, and this may also have benefit for her mood as well.  Advised that if she has any difficulty with this  medication to contact the office.  Advised to monitor for irritability specifically or agitation with this medication due to its ability to be mildly activating.    Follow-up in 1 month for titration of medication as well as weight check and further discussion of mood if necessary.    Follow Up   No follow-ups on file.  Patient was given instructions and counseling regarding her condition or for health maintenance advice. Please see specific information pulled into the AVS if appropriate.     Nataliia Jurado, DO